# Patient Record
Sex: FEMALE
[De-identification: names, ages, dates, MRNs, and addresses within clinical notes are randomized per-mention and may not be internally consistent; named-entity substitution may affect disease eponyms.]

---

## 2021-10-27 ENCOUNTER — DASHBOARD ENCOUNTERS (OUTPATIENT)
Age: 36
End: 2021-10-27

## 2021-10-27 ENCOUNTER — OFFICE VISIT (OUTPATIENT)
Dept: URBAN - NONMETROPOLITAN AREA CLINIC 2 | Facility: CLINIC | Age: 36
End: 2021-10-27
Payer: SELF-PAY

## 2021-10-27 ENCOUNTER — WEB ENCOUNTER (OUTPATIENT)
Dept: URBAN - NONMETROPOLITAN AREA CLINIC 2 | Facility: CLINIC | Age: 36
End: 2021-10-27

## 2021-10-27 DIAGNOSIS — R10.13 EPIGASTRIC PAIN: ICD-10-CM

## 2021-10-27 PROCEDURE — 99204 OFFICE O/P NEW MOD 45 MIN: CPT | Performed by: INTERNAL MEDICINE

## 2021-10-27 RX ORDER — OMEPRAZOLE 20 MG/1
1 TABLET 30 MINUTES BEFORE MORNING MEAL TABLET, DELAYED RELEASE ORAL ONCE A DAY
Qty: 30 | Refills: 3 | OUTPATIENT
Start: 2021-10-27

## 2021-10-27 NOTE — HPI-TODAY'S VISIT:
Ms. Julien is a new patient referred by the emergency room at [facility] for epigastric pain.  She states that approximately 7 years ago she had epigastric pain and was seen at [place].  She was told she had gastritis and treated with medicines which helped her symptoms.  Her symptoms then resolved.  Last month she again presented to [facility] with epigastric pain.  Labs and ultrasound of the gallbladder were normal.  She was discharged on Carafate and omeprazole.  Her symptoms dramatically improved.  She is try to minimize spicy acidic foods.  She does occasionally drink caffeine which stirs up symptoms.  She denies any dysphagia or nausea and vomiting.

## 2022-01-26 ENCOUNTER — OFFICE VISIT (OUTPATIENT)
Dept: URBAN - NONMETROPOLITAN AREA CLINIC 2 | Facility: CLINIC | Age: 37
End: 2022-01-26